# Patient Record
Sex: MALE | URBAN - METROPOLITAN AREA
[De-identification: names, ages, dates, MRNs, and addresses within clinical notes are randomized per-mention and may not be internally consistent; named-entity substitution may affect disease eponyms.]

---

## 2022-02-17 ENCOUNTER — OFFICE VISIT (OUTPATIENT)
Dept: URBAN - METROPOLITAN AREA CLINIC 48 | Facility: CLINIC | Age: 52
End: 2022-02-17
Payer: COMMERCIAL

## 2022-02-17 DIAGNOSIS — D23.112 OTHER BENIGN NEOPLASM SKIN/ RIGHT LOWER EYELID, INC CANTHUS: Primary | ICD-10-CM

## 2022-02-17 PROCEDURE — 92004 COMPRE OPH EXAM NEW PT 1/>: CPT | Performed by: OPHTHALMOLOGY

## 2022-02-17 ASSESSMENT — INTRAOCULAR PRESSURE
OD: 17
OS: 14

## 2022-02-17 NOTE — IMPRESSION/PLAN
Impression: Other benign neoplasm skin/ right lower eyelid, inc canthus: D23.112. Plan: R/B/A of excision reviewed. Will not bx if it is a classic Hydrocystoma. Discussed with pt. how the wound would heal without sutures to avoid possible ectropion from excision of eyelid margins. Please schedule MP to remove Hydrocystoma from RLL 
- next MP day.

## 2022-03-11 ENCOUNTER — PROCEDURE (OUTPATIENT)
Dept: URBAN - METROPOLITAN AREA CLINIC 48 | Facility: CLINIC | Age: 52
End: 2022-03-11
Payer: COMMERCIAL

## 2022-03-11 PROCEDURE — 11440 EXC FACE-MM B9+MARG 0.5 CM/<: CPT | Performed by: OPHTHALMOLOGY

## 2022-03-11 RX ORDER — NEOMYCIN, POLYMYXIN B SULFATES, DEXAMETHASONE 1; 3.5; 1 MG/G; MG/G; [USP'U]/G
OINTMENT OPHTHALMIC
Qty: 1 | Refills: 3 | Status: INACTIVE
Start: 2022-03-11 | End: 2022-03-11

## 2022-04-12 ENCOUNTER — OFFICE VISIT (OUTPATIENT)
Dept: URBAN - METROPOLITAN AREA CLINIC 48 | Facility: CLINIC | Age: 52
End: 2022-04-12
Payer: COMMERCIAL

## 2022-04-12 DIAGNOSIS — D23.112 OTHER BENIGN NEOPLASM SKIN/ RIGHT LOWER EYELID, INC CANTHUS: Primary | ICD-10-CM

## 2022-04-12 PROCEDURE — 92012 INTRM OPH EXAM EST PATIENT: CPT | Performed by: OPHTHALMOLOGY

## 2022-04-12 NOTE — IMPRESSION/PLAN
Impression: Other benign neoplasm skin/ right lower eyelid, inc canthus: D23.112. Plan: R/B/A of excision reviewed. No abnormalities noted on exam. Will continue to monitor. 

RTC PRN